# Patient Record
Sex: FEMALE | Race: WHITE | NOT HISPANIC OR LATINO
[De-identification: names, ages, dates, MRNs, and addresses within clinical notes are randomized per-mention and may not be internally consistent; named-entity substitution may affect disease eponyms.]

---

## 2018-02-19 ENCOUNTER — TRANSCRIPTION ENCOUNTER (OUTPATIENT)
Age: 73
End: 2018-02-19

## 2019-01-19 ENCOUNTER — TRANSCRIPTION ENCOUNTER (OUTPATIENT)
Age: 74
End: 2019-01-19

## 2019-02-26 ENCOUNTER — TRANSCRIPTION ENCOUNTER (OUTPATIENT)
Age: 74
End: 2019-02-26

## 2020-09-25 ENCOUNTER — TRANSCRIPTION ENCOUNTER (OUTPATIENT)
Age: 75
End: 2020-09-25

## 2021-03-29 ENCOUNTER — TRANSCRIPTION ENCOUNTER (OUTPATIENT)
Age: 76
End: 2021-03-29

## 2024-05-29 ENCOUNTER — LABORATORY RESULT (OUTPATIENT)
Age: 79
End: 2024-05-29

## 2024-05-29 ENCOUNTER — APPOINTMENT (OUTPATIENT)
Dept: RHEUMATOLOGY | Facility: CLINIC | Age: 79
End: 2024-05-29
Payer: MEDICARE

## 2024-05-29 VITALS
BODY MASS INDEX: 35.63 KG/M2 | WEIGHT: 227 LBS | DIASTOLIC BLOOD PRESSURE: 76 MMHG | OXYGEN SATURATION: 98 % | SYSTOLIC BLOOD PRESSURE: 125 MMHG | HEIGHT: 67 IN | HEART RATE: 81 BPM

## 2024-05-29 DIAGNOSIS — M79.89 OTHER SPECIFIED SOFT TISSUE DISORDERS: ICD-10-CM

## 2024-05-29 DIAGNOSIS — M25.50 PAIN IN UNSPECIFIED JOINT: ICD-10-CM

## 2024-05-29 PROCEDURE — 99204 OFFICE O/P NEW MOD 45 MIN: CPT

## 2024-05-29 PROCEDURE — G2211 COMPLEX E/M VISIT ADD ON: CPT

## 2024-05-29 NOTE — ASSESSMENT
[FreeTextEntry1] : 79F with primary biliary cirrhosis, ?CHF on diuretics, reported history of PMR, OA, here for evaluation of joint pains primarily in bilateral hips, also to a lesser extent in the knees.  Currently being tapered down on prednisone for PMR, at 5 mg QD, prior rheumatologist was later not convinced of PMR diagnosis (JAM negative in 8/2023, ESR was also age appropriate)- and Xrays showed severe OA in b/l hips, and moderate OA in b/l knees.  At this time I do suspect OA being the primary diagnosis for this patient.; however, based on reported history of PMR will check ESR and other autoimmune tests including tests for RA, SLE, and Sjogrens, as well as TSH and uric acid level. Will check mitochondrial antibody as well given PBC noted in the chart, which is an autoimmune condition and can predispose her to getting other autoimmune conditions.   In the meantime, patient was given options for treatment of OA of the hips- including referral to radiology for US guided steroid injection- given her already being on PO steroids and her CHF- this is not ideal- she opts to try PT first-  referral for home care PT was given due to patient's limited mobility due to pain.  She can take limited amount of tylenol too ( no more than 2g daily due to her hx of transaminitis and PBC).  Should not be taking NSAID due to CHF and reported hx of ?CKD.

## 2024-05-29 NOTE — PHYSICAL EXAM
[General Appearance - Alert] : alert [General Appearance - In No Acute Distress] : in no acute distress [Sclera] : the sclera and conjunctiva were normal [Extraocular Movements] : extraocular movements were intact [Outer Ear] : the ears and nose were normal in appearance [Exaggerated Use Of Accessory Muscles For Inspiration] : no accessory muscle use [FreeTextEntry1] : 3+ pitting edema in b/l lower extremities [Musculoskeletal - Swelling] : no joint swelling seen [Skin Color & Pigmentation] : normal skin color and pigmentation [] : no rash [Skin Lesions] : no skin lesions [No Focal Deficits] : no focal deficits [Oriented To Time, Place, And Person] : oriented to person, place, and time [Affect] : the affect was normal [Mood] : the mood was normal

## 2024-05-29 NOTE — REVIEW OF SYSTEMS
[Feeling Tired] : feeling tired [As Noted in HPI] : as noted in HPI [Lower Ext Edema] : lower extremity edema [Arthralgias] : arthralgias [Joint Pain] : joint pain [Joint Swelling] : no joint swelling [Joint Stiffness] : joint stiffness [Negative] : Heme/Lymph

## 2024-05-29 NOTE — HISTORY OF PRESENT ILLNESS
[FreeTextEntry1] : 79F with primary biliary cirrhosis, ?CHF on diuretics, reported history of PMR, OA, here for evaluation of joint pains primarily in bilateral hips, also to a lesser extent in the knees.    in 2011 had severe transaminitis, needed stenting for bile ducts, needed abdominal surgery (rerouting of bile ducts) but gallbladder was reportedly fused to the liver.  Rare to no alcohol use reported.   Had seen rheumatologist Dr. Philomena Mcpherson for past 3 years, was only treated with steroids for presumed PMR; but states JAM negative in 2023. Has been tapering down steroids, initially was on 20 mg QD, most recently has been on prednisone 5 mg QD.  Had recent Xrays of b/l hips and knees, showing severe OA of b/l hips, but orthopedist told her she was not a surgical candidate; also moderate OA in b/l knees; she is reporting primarily b/l hip joints. Has not tried PT yet. She uses walker to ambulate, states she gets fatigued with exertion, and has hip pain on ambulation.   Denies rashes, oral ulcers, sicca symptoms, or weight loss. She has swelling in b/l lower extremities, takes torsemide and spironolactone presumably for CHF. States she has elevated kidney markers as well.  [Weight Loss] : no weight loss [Fatigue] : fatigue [Malar Facial Rash] : no malar facial rash [Skin Lesions] : no lesions [Skin Nodules] : no skin nodules [Oral Ulcers] : no oral ulcers [Dry Mouth] : no dry mouth [Arthralgias] : arthralgias [Joint Swelling] : no joint swelling [Morning Stiffness] : no morning stiffness [Dry Eyes] : no dry eyes

## 2024-05-29 NOTE — DATA REVIEWED
[FreeTextEntry1] : bilateral knee Xrays 8/10/23: moderate osteoarthritic appearing changes in both knees R. hip Xray- severe osteoarthritis 8/2023 L. hip Xray- severe osteoarthritis 8/2023

## 2024-06-06 ENCOUNTER — NON-APPOINTMENT (OUTPATIENT)
Age: 79
End: 2024-06-06

## 2024-06-06 LAB
25(OH)D3 SERPL-MCNC: 39.8 NG/ML
ALBUMIN SERPL ELPH-MCNC: 4.3 G/DL
ALP BLD-CCNC: 360 U/L
ALT SERPL-CCNC: 34 U/L
ANA PAT FLD IF-IMP: ABNORMAL
ANA SER IF-ACNC: ABNORMAL
ANION GAP SERPL CALC-SCNC: 17 MMOL/L
AST SERPL-CCNC: 34 U/L
BASOPHILS # BLD AUTO: 0.01 K/UL
BASOPHILS NFR BLD AUTO: 0.2 %
BILIRUB SERPL-MCNC: 0.8 MG/DL
BUN SERPL-MCNC: 39 MG/DL
C3 SERPL-MCNC: 88 MG/DL
C4 SERPL-MCNC: 17 MG/DL
CALCIUM SERPL-MCNC: 9.1 MG/DL
CCP AB SER IA-ACNC: <8 UNITS
CHLORIDE SERPL-SCNC: 98 MMOL/L
CO2 SERPL-SCNC: 22 MMOL/L
CREAT SERPL-MCNC: 1.29 MG/DL
CRP SERPL-MCNC: 14 MG/L
DSDNA AB SER-ACNC: 1 IU/ML
EGFR: 42 ML/MIN/1.73M2
ENA RNP AB SER IA-ACNC: 0.2 AL
ENA SM AB SER IA-ACNC: <0.2 AL
ENA SS-A AB SER IA-ACNC: <0.2 AL
ENA SS-B AB SER IA-ACNC: <0.2 AL
EOSINOPHIL # BLD AUTO: 0.04 K/UL
EOSINOPHIL NFR BLD AUTO: 0.6 %
ERYTHROCYTE [SEDIMENTATION RATE] IN BLOOD BY WESTERGREN METHOD: 24 MM/HR
GLUCOSE SERPL-MCNC: 112 MG/DL
HCT VFR BLD CALC: 42.2 %
HGB BLD-MCNC: 13.8 G/DL
IMM GRANULOCYTES NFR BLD AUTO: 0.2 %
LYMPHOCYTES # BLD AUTO: 0.47 K/UL
LYMPHOCYTES NFR BLD AUTO: 7.5 %
MAN DIFF?: NORMAL
MCHC RBC-ENTMCNC: 30.5 PG
MCHC RBC-ENTMCNC: 32.7 GM/DL
MCV RBC AUTO: 93.4 FL
MITOCHONDRIA AB SER IF-ACNC: NORMAL
MONOCYTES # BLD AUTO: 0.27 K/UL
MONOCYTES NFR BLD AUTO: 4.3 %
NEUTROPHILS # BLD AUTO: 5.49 K/UL
NEUTROPHILS NFR BLD AUTO: 87.2 %
PLATELET # BLD AUTO: 112 K/UL
POTASSIUM SERPL-SCNC: 4.7 MMOL/L
PROT SERPL-MCNC: 7.3 G/DL
RBC # BLD: 4.52 M/UL
RBC # FLD: 14.1 %
RF+CCP IGG SER-IMP: NEGATIVE
RHEUMATOID FACT SER QL: 15 IU/ML
SODIUM SERPL-SCNC: 137 MMOL/L
THYROGLOB AB SERPL-ACNC: <20 IU/ML
THYROPEROXIDASE AB SERPL IA-ACNC: <10 IU/ML
TSH SERPL-ACNC: 1.5 UIU/ML
URATE SERPL-MCNC: 9.3 MG/DL
WBC # FLD AUTO: 6.29 K/UL

## 2024-06-14 ENCOUNTER — APPOINTMENT (OUTPATIENT)
Dept: RHEUMATOLOGY | Facility: CLINIC | Age: 79
End: 2024-06-14
Payer: MEDICARE

## 2024-06-14 VITALS
DIASTOLIC BLOOD PRESSURE: 73 MMHG | HEART RATE: 90 BPM | OXYGEN SATURATION: 96 % | HEIGHT: 67 IN | SYSTOLIC BLOOD PRESSURE: 149 MMHG

## 2024-06-14 DIAGNOSIS — Z51.81 ENCOUNTER FOR THERAPEUTIC DRUG LVL MONITORING: ICD-10-CM

## 2024-06-14 DIAGNOSIS — Z79.52 LONG TERM (CURRENT) USE OF SYSTEMIC STEROIDS: ICD-10-CM

## 2024-06-14 DIAGNOSIS — K74.60 UNSPECIFIED CIRRHOSIS OF LIVER: ICD-10-CM

## 2024-06-14 DIAGNOSIS — Z87.898 PERSONAL HISTORY OF OTHER SPECIFIED CONDITIONS: ICD-10-CM

## 2024-06-14 DIAGNOSIS — R76.8 OTHER SPECIFIED ABNORMAL IMMUNOLOGICAL FINDINGS IN SERUM: ICD-10-CM

## 2024-06-14 DIAGNOSIS — Z13.820 ENCOUNTER FOR SCREENING FOR OSTEOPOROSIS: ICD-10-CM

## 2024-06-14 DIAGNOSIS — R11.15 CYCLICAL VOMITING SYNDROME UNRELATED TO MIGRAINE: ICD-10-CM

## 2024-06-14 DIAGNOSIS — Z80.8 FAMILY HISTORY OF MALIGNANT NEOPLASM OF OTHER ORGANS OR SYSTEMS: ICD-10-CM

## 2024-06-14 DIAGNOSIS — M35.3 POLYMYALGIA RHEUMATICA: ICD-10-CM

## 2024-06-14 DIAGNOSIS — M16.0 BILATERAL PRIMARY OSTEOARTHRITIS OF HIP: ICD-10-CM

## 2024-06-14 DIAGNOSIS — R74.8 ABNORMAL LEVELS OF OTHER SERUM ENZYMES: ICD-10-CM

## 2024-06-14 PROCEDURE — 99215 OFFICE O/P EST HI 40 MIN: CPT

## 2024-06-14 RX ORDER — PREDNISONE 5 MG/1
5 TABLET ORAL
Refills: 0 | Status: ACTIVE | COMMUNITY

## 2024-06-14 RX ORDER — PREDNISONE 1 MG/1
1 TABLET ORAL
Refills: 0 | Status: ACTIVE | COMMUNITY

## 2024-06-16 LAB
25(OH)D3 SERPL-MCNC: 37.4 NG/ML
CRP SERPL-MCNC: 13 MG/L
ERYTHROCYTE [SEDIMENTATION RATE] IN BLOOD BY WESTERGREN METHOD: 25 MM/HR
INR PPP: 0.95 RATIO
MITOCHONDRIA AB SER IF-ACNC: NORMAL
PT BLD: 10.7 SEC
SMOOTH MUSCLE AB SER QL IF: NORMAL

## 2024-06-17 LAB — LKM AB SER QL IF: <20.1 UNITS

## 2024-06-18 PROBLEM — Z13.820 SCREENING FOR OSTEOPOROSIS: Status: ACTIVE | Noted: 2024-06-18

## 2024-06-18 PROBLEM — M16.0 PRIMARY OSTEOARTHRITIS OF BOTH HIPS: Status: ACTIVE | Noted: 2024-05-29

## 2024-06-18 PROBLEM — Z80.8 FAMILY HISTORY OF MELANOMA: Status: ACTIVE | Noted: 2024-06-18

## 2024-06-18 NOTE — ADDENDUM
[FreeTextEntry1] : This note was written by Kym Gonzalez, acting as the  for Dr. Dawkins. This note accurately reflects the work and decisions made by Dr. Dawkins.

## 2024-06-18 NOTE — ASSESSMENT
[FreeTextEntry1] : Ms. TRUJILLO is a non-smoker with a PMHx of PBC, ? CHF (on diuretics), PMR, OA who presents today for an initial consultation for her trouble walking.    Patient presents for f/u evaluation as well as a transfer of care from previous provider.  This encounter included a comprehensive hx/pe as well as review of prior records including notes, labs, images Last seen by Dr. Rodriguez as a NPA May 2024  # PMR dx  2021 - suddently awakend in the am - AMS severe pain in all the bones and couldnt get out of bed  -- Saw Dr. Pool - was told that  because she could not raise her arms -> PMR -- treated with prednisone 20mg and tapered over the last 3 years.  -- remains on 5-6 mg daily and cannot seem to decrease it anyfurther  -- no GCA symptoms currently  -- suspect most of her symptoms currently are related to degeneratiave changes, not active inflammatory disease  -- check inflammatory markers -- goal would be to come off the steroids - however after all this time may not be able to - will start a slow taper and assess progress.  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   the Alk Phos has remained elevated.   no etoh used.  no IBD issues -- often related to other inflammatory aiCTD - though none apparently present  # JAM positive  -- could be just 2/2 PBC - r/o other inflammatory aiCTD   # OA xrays of the knees and hips in the past with severe OA was told bone on bone but not surgical candidate  - she uses walker to ambulate and decribes hip pain  - gets clicking in the knees  -- d/w patient IA steroids/  VS -- is willing to consider eitiher - for hip would need this under fluorscopic guidance  # long term use of steroids, medical monitoring  -- labs  -- DEXA   Today's medical care services serve as the continuing focal point for needed health care services that are part of ongoing care related to a patient's single, serious condition or a complex condition.   More than 50% of the encounter was spent counseling the patient on differential, workup, disease course and treatment/management. Education was provided to the patient during this encounter. All questions and concerns were addressed and answered. The patient verbalized understanding and agreed to the plan.   Patient has been instructed to call for an appointment if new symptoms develop. Patient has been instructed to make a follow-up appointment in 3 months.   Time spent on the encounter included, but is not limited to, preparing to see the patient, obtaining and/or reviewing separately obtained history, performing the evaluation, counseling and educating, independently interpreting results with communication to patient, order placement, referring and/or communicating with other health professionals as described, and documenting clinical information in the electronic health record.

## 2024-06-18 NOTE — HISTORY OF PRESENT ILLNESS
[FreeTextEntry1] : Ms. TRUJILLO is a non-smoker with a PMHx of PBC, ? CHF (on diuretics), PMR, OA who presents today for an initial consultation for her trouble walking.    Patient presents for f/u evaluation as well as a transfer of care from previous provider.  This encounter included a comprehensive hx/pe as well as review of prior records including notes, labs, images Last seen by Dr. Rodriguez as a NPA May 2024  Is here for several issues:  In terms of PMR - dx PMR  - suddently awakend in the am - AMS severe pain in all the bones and couldnt get out of bed  - Saw Dr. Pool - was told that  because she could not raise her arms -> PMR - treated with prednisone 20mg and tapered over the last 3 years.  - remains on 5-6 mg daily and cannot seem to decrease it anyfurther   in terms of PBC dx  - severe transaminitis and jaundice - stenting for the bile ducts - GB was fused to the liver.   the Alk Phos has remained elevated.   no etoh used.  no IBD issues  in terms of OA xrays of the knees and hips in the past with severe OA was told bone on bone but not surgical candidate  - she uses walker to ambulate and decribes hip pain  - gets clicking in the knees  - hasnt' had shots  General ROS PCP = dr. Allen ([place]) Cardio evaluation -> LVH, diastolic dysfunction, AS SCC  - deep and had it removed   SOc  - dont drink alcho  -  - never cig  - no children   no FHX - none for arthriti  - brother melanoma       PMHx has a heart situation and swelling of the joitns  - lots of water pills  - the water pills  TO-DO - check inflammatory markers - check serologies and sub-serologies - check activity monitoring labs - check x-rays   Today's medical care services serve as the continuing focal point for needed health care services that are part of ongoing care related to a patient's single, serious condition or a complex condition.   More than 50% of the encounter was spent counseling the patient on differential, workup, disease course and treatment/management. Education was provided to the patient during this encounter. All questions and concerns were addressed and answered. The patient verbalized understanding and agreed to the plan.   Patient has been instructed to call for an appointment if new symptoms develop. Patient has been instructed to make a follow-up appointment in 3 months.   Time spent on the encounter included, but is not limited to, preparing to see the patient, obtaining and/or reviewing separately obtained history, performing the evaluation, counseling and educating, independently interpreting results with communication to patient, order placement, referring and/or communicating with other health professionals as described, and documenting clinical information in the electronic health record.

## 2024-06-18 NOTE — PHYSICAL EXAM
[TextEntry] : CONSTITUTIONAL:  Alert, NAD, well nourished, well developed with normal voice EYES:  Normal white sclera, EOMI, lacrimal glands unremarkable ENT: Normal outer ear/nose, MMM, no oral ulcers NECK: normal appearance, thyroid not enlarged PULMONARY: no respiratory distress, on RA, CTA bilaterally CARDIO:  RRR, S1, S2, no rubs - loud systolic murmer VASCULAR:  no RP, LR, C, C, - bilatearal LE edema LYMPHATICS: no ant/post/supraclavicular nodes BACK: no CVA tenderness, no spinal tenderness, SI NT, FMTP neg MSK: no synovitis, no dactylitis - degenerative changes SKIN: no rashes, nodules or tophi.  turgor normal.   no nail pitting. NEURO:  MS 5/5 proximally and distally.   No focal defects.  PSYCHE: alert and oriented x3, normal insight/judgement, normal affect. mood okay.  Recent memory intact

## 2024-06-19 LAB — SOLUBLE LIVER IGG SER IA-ACNC: 2.1

## 2024-06-20 LAB
ALP BLD-CCNC: 299 IU/L
ALP BONE CFR SERPL: 44 %
ALP INTEST CFR SERPL: 0 %
ALP LIVER CFR SERPL: 56 %

## 2024-06-21 LAB
ANTI - GP210 ANTIBODY, IGG: 1.9 UNITS
ANTI - SP100 ANTIBODY, IGG: 1.7 UNITS

## 2024-06-25 LAB
ANTI-BETA2 GLYCOPROTEIN 1 IGG CONCENTRATION: 6 U/ML
ANTI-BETA2 GLYCOPROTEIN 1 IGM CONCENTRATION: 8 U/ML
ANTI-CARDIOLIPIN IGG CONCENTRATION: 3 GPL
ANTI-CARDIOLIPIN IGM CONCENTRATION: 40 MPL
ANTI-CENP IGG CONCENTRATION: <0.4 U/ML
ANTI-CYCLIC CITRULLINATED PEPTIDE IGG CONCENTRATION: 2 U/ML
ANTI-DOUBLE-STRANDED DNA IGG CONCENTRATION: 130 IU/ML
ANTI-JO-1 IGG CONCENTRATION: <0.3 U/ML
ANTI-NUCLEAR ANTIBODIES - CYTOPLASMIC PATTERN: NORMAL
ANTI-NUCLEAR ANTIBODIES - PRIMARY NUCLEAR PATTERN: NORMAL
ANTI-NUCLEAR ANTIBODIES - PRIMARY PATTERN TITER: ABNORMAL
ANTI-NUCLEAR ANTIBODIES IGG CONCENTRATION: 34 UNITS
ANTI-RNA POL III IGG CONCENTRATION: <0.7 U/ML
ANTI-RNP70 IGG CONCENTRATION: 4 U/ML
ANTI-RO52 IGG CONCENTRATION: <0.3 U/ML
ANTI-RO60 IGG CONCENTRATION: <0.4 U/ML
ANTI-SCL-70 IGG CONCENTRATION: <0.6 U/ML
ANTI-SMITH IGG CONCENTRATION: <0.7 U/ML
ANTI-SS-B (LA) IGG CONCENTRATION: <0.4 U/ML
ANTI-THYROGLOBULIN IGG CONCENTRATION: 42 IU/ML
ANTI-THYROID PEROXIDASE IGG CONCENTRATION: <4 IU/ML
ANTI-U1RNP IGG CONCENTRATION: 3 U/ML
AVISE LUPUS RESULT: NORMAL
B-LYMPHOCYTE-BOUND C4D (BC4D) LEVEL: NORMAL
ERYTHROCYTE-BOUND C4D (EC4D) LEVEL: 5
RHEUMATOID FACTOR (IGA) CONCENTRATION: 5 IU/ML
RHEUMATOID FACTOR (IGM) CONCENTRATION: 6 IU/ML

## 2024-07-16 ENCOUNTER — APPOINTMENT (OUTPATIENT)
Dept: RHEUMATOLOGY | Facility: CLINIC | Age: 79
End: 2024-07-16
Payer: MEDICARE

## 2024-07-16 VITALS
HEIGHT: 67 IN | OXYGEN SATURATION: 97 % | DIASTOLIC BLOOD PRESSURE: 74 MMHG | HEART RATE: 82 BPM | SYSTOLIC BLOOD PRESSURE: 147 MMHG | BODY MASS INDEX: 35 KG/M2 | WEIGHT: 223 LBS

## 2024-07-16 DIAGNOSIS — M16.0 BILATERAL PRIMARY OSTEOARTHRITIS OF HIP: ICD-10-CM

## 2024-07-16 DIAGNOSIS — M25.50 PAIN IN UNSPECIFIED JOINT: ICD-10-CM

## 2024-07-16 DIAGNOSIS — Z79.52 LONG TERM (CURRENT) USE OF SYSTEMIC STEROIDS: ICD-10-CM

## 2024-07-16 DIAGNOSIS — R76.8 OTHER SPECIFIED ABNORMAL IMMUNOLOGICAL FINDINGS IN SERUM: ICD-10-CM

## 2024-07-16 DIAGNOSIS — M35.3 POLYMYALGIA RHEUMATICA: ICD-10-CM

## 2024-07-16 DIAGNOSIS — Z51.81 ENCOUNTER FOR THERAPEUTIC DRUG LVL MONITORING: ICD-10-CM

## 2024-07-16 PROCEDURE — 99214 OFFICE O/P EST MOD 30 MIN: CPT

## 2024-08-23 ENCOUNTER — NON-APPOINTMENT (OUTPATIENT)
Age: 79
End: 2024-08-23

## 2024-09-10 ENCOUNTER — APPOINTMENT (OUTPATIENT)
Dept: RHEUMATOLOGY | Facility: CLINIC | Age: 79
End: 2024-09-10
Payer: MEDICARE

## 2024-09-10 VITALS
DIASTOLIC BLOOD PRESSURE: 75 MMHG | HEART RATE: 78 BPM | OXYGEN SATURATION: 96 % | BODY MASS INDEX: 35.31 KG/M2 | HEIGHT: 67 IN | SYSTOLIC BLOOD PRESSURE: 149 MMHG | WEIGHT: 225 LBS

## 2024-09-10 DIAGNOSIS — R76.8 OTHER SPECIFIED ABNORMAL IMMUNOLOGICAL FINDINGS IN SERUM: ICD-10-CM

## 2024-09-10 DIAGNOSIS — M16.0 BILATERAL PRIMARY OSTEOARTHRITIS OF HIP: ICD-10-CM

## 2024-09-10 DIAGNOSIS — G89.29 PAIN IN RIGHT KNEE: ICD-10-CM

## 2024-09-10 DIAGNOSIS — M25.561 PAIN IN RIGHT KNEE: ICD-10-CM

## 2024-09-10 DIAGNOSIS — M25.562 PAIN IN RIGHT KNEE: ICD-10-CM

## 2024-09-10 DIAGNOSIS — Z51.81 ENCOUNTER FOR THERAPEUTIC DRUG LVL MONITORING: ICD-10-CM

## 2024-09-10 DIAGNOSIS — M25.50 PAIN IN UNSPECIFIED JOINT: ICD-10-CM

## 2024-09-10 DIAGNOSIS — Z79.52 LONG TERM (CURRENT) USE OF SYSTEMIC STEROIDS: ICD-10-CM

## 2024-09-10 DIAGNOSIS — M35.3 POLYMYALGIA RHEUMATICA: ICD-10-CM

## 2024-09-10 PROCEDURE — G2211 COMPLEX E/M VISIT ADD ON: CPT

## 2024-09-10 PROCEDURE — 99214 OFFICE O/P EST MOD 30 MIN: CPT

## 2024-09-10 NOTE — PHYSICAL EXAM
[General Appearance - In No Acute Distress] : in no acute distress [General Appearance - Alert] : alert [Auscultation Breath Sounds / Voice Sounds] : lungs were clear to auscultation bilaterally [Abnormal Walk] : normal gait [Nail Clubbing] : no clubbing  or cyanosis of the fingernails [Musculoskeletal - Swelling] : no joint swelling seen [Skin Color & Pigmentation] : normal skin color and pigmentation [Motor Tone] : muscle strength and tone were normal [Skin Turgor] : normal skin turgor [] : no rash [Oriented To Time, Place, And Person] : oriented to person, place, and time [Impaired Insight] : insight and judgment were intact [Affect] : the affect was normal

## 2024-09-10 NOTE — PHYSICAL EXAM
[General Appearance - Alert] : alert [General Appearance - In No Acute Distress] : in no acute distress [Auscultation Breath Sounds / Voice Sounds] : lungs were clear to auscultation bilaterally [Abnormal Walk] : normal gait [Nail Clubbing] : no clubbing  or cyanosis of the fingernails [Musculoskeletal - Swelling] : no joint swelling seen [Skin Color & Pigmentation] : normal skin color and pigmentation [Motor Tone] : muscle strength and tone were normal [Skin Turgor] : normal skin turgor [] : no rash [Oriented To Time, Place, And Person] : oriented to person, place, and time [Impaired Insight] : insight and judgment were intact [Affect] : the affect was normal

## 2024-09-13 LAB
ALBUMIN SERPL ELPH-MCNC: 4.2 G/DL
ALP BLD-CCNC: 329 U/L
ALT SERPL-CCNC: 33 U/L
ANION GAP SERPL CALC-SCNC: 15 MMOL/L
AST SERPL-CCNC: 34 U/L
BILIRUB SERPL-MCNC: 0.7 MG/DL
BUN SERPL-MCNC: 39 MG/DL
C3 SERPL-MCNC: 94 MG/DL
C4 SERPL-MCNC: 15 MG/DL
CALCIUM SERPL-MCNC: 9.3 MG/DL
CHLORIDE SERPL-SCNC: 101 MMOL/L
CO2 SERPL-SCNC: 24 MMOL/L
CREAT SERPL-MCNC: 1.2 MG/DL
CRP SERPL-MCNC: 13 MG/L
DSDNA AB SER-ACNC: 1 IU/ML
EGFR: 46 ML/MIN/1.73M2
ERYTHROCYTE [SEDIMENTATION RATE] IN BLOOD BY WESTERGREN METHOD: 33 MM/HR
G6PD SER-CCNC: 14.6 U/G HGB
HCT VFR BLD CALC: 43.1 %
HGB BLD-MCNC: 13.7 G/DL
MCHC RBC-ENTMCNC: 30.6 PG
MCHC RBC-ENTMCNC: 31.8 GM/DL
MCV RBC AUTO: 96.4 FL
PLATELET # BLD AUTO: NORMAL K/UL
POTASSIUM SERPL-SCNC: 4.2 MMOL/L
PROT SERPL-MCNC: 7.4 G/DL
RBC # BLD: 4.47 M/UL
RBC # FLD: 15 %
RIBOSOMAL P AB SER IA-ACNC: <0.2 AL
SODIUM SERPL-SCNC: 139 MMOL/L
WBC # FLD AUTO: 5.66 K/UL

## 2024-09-13 NOTE — ASSESSMENT
[FreeTextEntry1] : Ms. TRUJILLO is a non-smoker with a PMHx of PBC, ? CHF (on diuretics), PMR, OA who presents today for follow-up.    # knee pain  noninflamamtory chronic  -- d/w patient options including steroids, VS -- would be possibly open to VS  -- xrays  -- PT and aquaterhapy - she notes that being in the pool over the summer was very helpful  # hip pain  non-inflammatory and not related to her PMR  -- ortho f/u   # PMR dx  2021 - suddently awakend in the am - AMS severe pain in all the bones and couldnt get out of bed  -- Saw Dr. Pool - was told that  because she could not raise her arms -> PMR -- no GCA symptoms currently  -- suspect most of her symptoms currently are related to degeneratiave changes, not active inflammatory disease  -- check inflammatory markers - were mildly elevated -- slower the taper of steroids - will reduce to 5 mg alternating with 4 mg daily -- t/c HCQ as a steroid sparing agent   # PBC dx 2011 - severe transaminitis and jaundice - stenting for the bile ducts - GB was fused to the liver.   the Alk Phos has remained elevated.   no etoh used.  no IBD issues -- often related to other inflammatory aiCTD - though none apparently present -- positive JAM could be 2/2  PBC - other PBC specific ab negative   # JAM positive  -- could be just 2/2 PBC - r/o other inflammatory aiCTD -- subserologies negative  # OA xrays of the knees and hips in the past with severe OA was told bone on bone but not surgical candidate  - she uses walker to ambulate and decribes hip pain  - gets clicking in the knees  -- d/w patient IA steroids/  VS -- is willing to consider eitiher - for hip would need this under fluorscopic guidance  # long term use of steroids, medical monitoring  -- labs  -- DEXA -- hcq start - check g6pd, check in wtih the hepatologists  Today's medical care services serve as the continuing focal point for needed health care services that are part of ongoing care related to a patient's single, serious condition or a complex condition.   More than 50% of the encounter was spent counseling the patient on differential, workup, disease course and treatment/management. Education was provided to the patient during this encounter. All questions and concerns were addressed and answered. The patient verbalized understanding and agreed to the plan.   Patient has been instructed to call for an appointment if new symptoms develop. Patient has been instructed to make a follow-up appointment in 3 months.   Time spent on the encounter included, but is not limited to, preparing to see the patient, obtaining and/or reviewing separately obtained history, performing the evaluation, counseling and educating, independently interpreting results with communication to patient, order placement, referring and/or communicating with other health professionals as described, and documenting clinical information in the electronic health record.

## 2024-09-13 NOTE — HISTORY OF PRESENT ILLNESS
[___ Month(s) Ago] : [unfilled] month(s) ago [FreeTextEntry1] : Ms. TRUJILLO is a non-smoker with a PMHx of PBC, ? CHF (on diuretics), PMR, OA who presents today for follow-up.  INTERFVAL Hx - coming down on the prednisone  - occasionally notes that she has to take asa - and is unclear to her if that is because she is feeling worse coming down  - reveiwed charu dexa  and it was okay  - interested in exploring potential orthopedic interventions of the hip - which this is the limiting issue at this point  - endorses some non-inflammatory knee pain - woudl consider some steroids in the knees - hasnt had xrays in a while   --------------------------------------------------------------  Is here for several issues:  In terms of PMR - dx PMR  - suddently awakend in the am - AMS severe pain in all the bones and couldnt get out of bed  - Saw Dr. Pool - was told that  because she could not raise her arms -> PMR - treated with prednisone 20mg and tapered over the last 3 years.  - remains on 5-6 mg daily and cannot seem to decrease it anyfurther   in terms of PBC dx  - severe transaminitis and jaundice - stenting for the bile ducts - GB was fused to the liver.   the Alk Phos has remained elevated.   no etoh used.  no IBD issues  in terms of OA xrays of the knees and hips in the past with severe OA was told bone on bone but not surgical candidate  - she uses walker to ambulate and decribes hip pain  - gets clicking in the knees  - hasnt' had shots  General ROS PCP = dr. Allen ([place]) Cardio evaluation -> LVH, diastolic dysfunction, AS SCC  - deep and had it removed   SOc  - dont drink alcho  -  - never cig  - no children   no FHX - none for arthriti  - brother melanoma    PMHx has a heart situation and swelling of the joitns  - lots of water pills  - the water pills

## 2024-09-13 NOTE — HISTORY OF PRESENT ILLNESS
Problem: Discharge Planning  Goal: Discharge to home or other facility with appropriate resources  Outcome: Progressing     Problem: Safety - Adult  Goal: Free from fall injury  Outcome: Progressing     Problem: Pain  Goal: Verbalizes/displays adequate comfort level or baseline comfort level  Outcome: Progressing     Problem: Skin/Tissue Integrity  Goal: Absence of new skin breakdown  Description: 1. Monitor for areas of redness and/or skin breakdown  2. Assess vascular access sites hourly  3. Every 4-6 hours minimum:  Change oxygen saturation probe site  4. Every 4-6 hours:  If on nasal continuous positive airway pressure, respiratory therapy assess nares and determine need for appliance change or resting period.   Outcome: Progressing [___ Month(s) Ago] : [unfilled] month(s) ago [FreeTextEntry1] : Ms. TRUJILLO is a non-smoker with a PMHx of PBC, ? CHF (on diuretics), PMR, OA who presents today for follow-up.  INTERFVAL Hx - coming down on the prednisone  - occasionally notes that she has to take asa - and is unclear to her if that is because she is feeling worse coming down  - reveiwed charu dexa  and it was okay  - interested in exploring potential orthopedic interventions of the hip - which this is the limiting issue at this point  - endorses some non-inflammatory knee pain - woudl consider some steroids in the knees - hasnt had xrays in a while   --------------------------------------------------------------  Is here for several issues:  In terms of PMR - dx PMR  - suddently awakend in the am - AMS severe pain in all the bones and couldnt get out of bed  - Saw Dr. Pool - was told that  because she could not raise her arms -> PMR - treated with prednisone 20mg and tapered over the last 3 years.  - remains on 5-6 mg daily and cannot seem to decrease it anyfurther   in terms of PBC dx  - severe transaminitis and jaundice - stenting for the bile ducts - GB was fused to the liver.   the Alk Phos has remained elevated.   no etoh used.  no IBD issues  in terms of OA xrays of the knees and hips in the past with severe OA was told bone on bone but not surgical candidate  - she uses walker to ambulate and decribes hip pain  - gets clicking in the knees  - hasnt' had shots  General ROS PCP = dr. Allen ([place]) Cardio evaluation -> LVH, diastolic dysfunction, AS SCC  - deep and had it removed   SOc  - dont drink alcho  -  - never cig  - no children   no FHX - none for arthriti  - brother melanoma    PMHx has a heart situation and swelling of the joitns  - lots of water pills  - the water pills